# Patient Record
Sex: MALE | Race: WHITE | URBAN - METROPOLITAN AREA
[De-identification: names, ages, dates, MRNs, and addresses within clinical notes are randomized per-mention and may not be internally consistent; named-entity substitution may affect disease eponyms.]

---

## 2017-02-24 ENCOUNTER — APPOINTMENT (OUTPATIENT)
Dept: URBAN - METROPOLITAN AREA SURGERY 9 | Age: 27
Setting detail: DERMATOLOGY
End: 2017-02-24

## 2017-02-24 DIAGNOSIS — B35.3 TINEA PEDIS: ICD-10-CM

## 2017-02-24 DIAGNOSIS — L30.1 DYSHIDROSIS [POMPHOLYX]: ICD-10-CM

## 2017-02-24 PROBLEM — L08.9 LOCAL INFECTION OF THE SKIN AND SUBCUTANEOUS TISSUE, UNSPECIFIED: Status: ACTIVE | Noted: 2017-02-24

## 2017-02-24 PROCEDURE — 99203 OFFICE O/P NEW LOW 30 MIN: CPT

## 2017-02-24 PROCEDURE — OTHER PRESCRIPTION: OTHER

## 2017-02-24 PROCEDURE — OTHER TREATMENT REGIMEN: OTHER

## 2017-02-24 PROCEDURE — OTHER COUNSELING: OTHER

## 2017-02-24 RX ORDER — KETOCONAZOLE 20 MG/G
CREAM TOPICAL
Qty: 3 | Refills: 3 | Status: ERX | COMMUNITY
Start: 2017-02-24

## 2017-02-24 RX ORDER — CLOBETASOL PROPIONATE 0.5 MG/G
CREAM TOPICAL
Qty: 1 | Refills: 2 | Status: ERX | COMMUNITY
Start: 2017-02-24

## 2017-02-24 ASSESSMENT — LOCATION DETAILED DESCRIPTION DERM
LOCATION DETAILED: LEFT ULNAR DORSAL HAND
LOCATION DETAILED: RIGHT DORSAL FOOT
LOCATION DETAILED: LEFT DORSAL FOOT
LOCATION DETAILED: RIGHT ULNAR DORSAL HAND

## 2017-02-24 ASSESSMENT — LOCATION SIMPLE DESCRIPTION DERM
LOCATION SIMPLE: LEFT HAND
LOCATION SIMPLE: RIGHT FOOT
LOCATION SIMPLE: LEFT FOOT
LOCATION SIMPLE: RIGHT HAND

## 2017-02-24 ASSESSMENT — LOCATION ZONE DERM
LOCATION ZONE: FEET
LOCATION ZONE: HAND

## 2017-02-24 NOTE — PROCEDURE: TREATMENT REGIMEN
Detail Level: Simple
Otc Regimen: Aggressive moisturizing and gentle cleansers
Initiate Treatment: Clobetasol cream twice a day up to 2 weeks as needed flares
Otc Regimen: Anti-fungal spray to disinfect shoes daily
Initiate Treatment: Ketoconazole cream as directed

## 2024-08-02 ENCOUNTER — APPOINTMENT (OUTPATIENT)
Dept: URBAN - METROPOLITAN AREA SURGERY 9 | Age: 34
Setting detail: DERMATOLOGY
End: 2024-08-02

## 2024-08-02 DIAGNOSIS — L23.9 ALLERGIC CONTACT DERMATITIS, UNSPECIFIED CAUSE: ICD-10-CM

## 2024-08-02 PROCEDURE — OTHER PRESCRIPTION MEDICATION MANAGEMENT: OTHER

## 2024-08-02 PROCEDURE — OTHER MIPS QUALITY: OTHER

## 2024-08-02 PROCEDURE — 99203 OFFICE O/P NEW LOW 30 MIN: CPT

## 2024-08-02 PROCEDURE — OTHER COUNSELING: OTHER

## 2024-08-02 PROCEDURE — OTHER PRESCRIPTION: OTHER

## 2024-08-02 RX ORDER — PREDNISONE 10 MG/1
TABLET ORAL
Qty: 50 | Refills: 0 | Status: ERX | COMMUNITY
Start: 2024-08-02

## 2024-08-02 RX ORDER — FEXOFENADINE HYDROCHLORIDE 180 MG/1
TABLET ORAL
Qty: 60 | Refills: 2 | Status: ERX | COMMUNITY
Start: 2024-08-02

## 2024-08-02 ASSESSMENT — LOCATION SIMPLE DESCRIPTION DERM: LOCATION SIMPLE: ABDOMEN

## 2024-08-02 ASSESSMENT — LOCATION DETAILED DESCRIPTION DERM: LOCATION DETAILED: PERIUMBILICAL SKIN

## 2024-08-02 ASSESSMENT — LOCATION ZONE DERM: LOCATION ZONE: TRUNK

## 2024-08-02 NOTE — PROCEDURE: PRESCRIPTION MEDICATION MANAGEMENT
Detail Level: Zone
Plan: Suspect he had a rebound from short PDN course and also re-exposure.\\nDdx also includes medication reaction and I have asked him to stop his supplements.
Render In Strict Bullet Format?: No
Initiate Treatment: Prednisone taper\\nFexofenadine 180mg\\\nTCS at home